# Patient Record
(demographics unavailable — no encounter records)

---

## 2024-10-29 NOTE — PHYSICAL EXAM
[Clean] : clean [Dry] : dry [Intact] : intact [Erythema] : no erythema [Drainage] : no drainage [Circumcised] : circumcised [Inguinal hernia] : no inguinal hernia [Testicle descended on left] : testicle descended on left [Testicle descended on right] : testicle descended on right [NL] : grossly intact

## 2024-10-29 NOTE — REASON FOR VISIT
[____ Week(s)] : [unfilled] week(s)  [Laparoscopic inguinal hernia repair] : laparoscopic inguinal hernia repair [Pain] : ~He/She~ does not have pain [Fever] : ~He/She~ does not have fever [Normal bowel movements] : ~He/She~ has normal bowel movements [Vomiting] : ~He/She~ does not have vomiting [Tolerating Diet] : ~He/She~ is tolerating diet [Redness at incision] : ~He/She~ does not have redness at incision [Drainage at incision] : ~He/She~ does not have drainage at incision [Swelling at surgical site] : ~He/She~ does not have swelling at surgical site [de-identified] : 10/8/24 [de-identified] : Dr. Poe

## 2024-10-29 NOTE — CONSULT LETTER
[Dear  ___] : Dear  [unfilled], [Courtesy Letter:] : I had the pleasure of seeing your patient, [unfilled], in my office today. [Please see my note below.] : Please see my note below. [Sincerely,] : Sincerely, [FreeTextEntry2] : Dr. Reyna [FreeTextEntry3] : Dotty Mcclain PA-C Supervisor, [facility] Department of General Pediatric Surgery & Trauma [place], [place] [address]

## 2024-10-29 NOTE — ASSESSMENT
[FreeTextEntry1] : NELY is a 6-month-old boy who is s/p laparoscopic left inguinal hernia repair with Dr. Sanders on 10/8 and presents today for a routine post op visit.  Mom states that he recovered well from surgery.  He is eating well, voiding and stooling normally.  On exam his incision is well healed, and his testes are down bilaterally.  I counseled Mom of the small chance of recurrence and she knows to call or come back in with any concerns.     Follow up with the pediatrician as usual and with pediatric surgery should any new or concerning symptoms arise. All questions answered.

## 2024-12-03 NOTE — HISTORY OF PRESENT ILLNESS
[FreeTextEntry1] : NV dry skin  [de-identified] : NELY CARTAGENA is a 7 month old male presenting for:  1. Red rash on the neck and chest for the past several months. Not very itchy. Has not tried HC that pediatrician prescribed because it was resolving on its own. Mother with a history of eczema.   soap: Pipette (fragrance free)  moisturizer: Vanicream, Aquaphor, Pipette (nonfragranced)  detergent: Attitude

## 2024-12-03 NOTE — ASSESSMENT
[Use of independent historian: [ enter independent historian's relationship to patient ] :____] : As the patient was unable to provide a complete and reliable history, I obtained clinical history from the patient's [unfilled] [FreeTextEntry1] : Atopic derm and xerosis, mild, new diagnosis with uncertain prognosis  -Start Hydrocortisone 2.5% ointment to AA BID for 2 weeks on, 1 week off. SED. - Vaseline prior to meals  Dry skin care reviewed:   - Take short showers/baths (avoid hot water)  - Use a mild soap (eg. CeraVe cleanser or Aquaphor)  - Use soap only on areas truly needed (underarms,groin,buttocks,fold areas, feet, face, hair)  - Pat off excess water and put moisturizer on immediately (within 3 min.)  Good moisturizing choices include:  1. Cetaphil cream (not baby Cetaphil)  2. CeraVe cream  3. Vanicream cream  4. Aquaphor ointment  5. Vaseline ointment  6. CeraVe ointment  - A moisturizer should always be applied after showering or bathing, but may be applied as many additional times as is necessary.  - RTC as needed

## 2024-12-03 NOTE — CONSULT LETTER
[Dear  ___] : Dear  [unfilled], [Consult Letter:] : I had the pleasure of evaluating your patient, [unfilled]. [Please see my note below.] : Please see my note below. [Consult Closing:] : Thank you very much for allowing me to participate in the care of this patient.  If you have any questions, please do not hesitate to contact me. [Sincerely,] : Sincerely, [FreeTextEntry3] : Krystle Shipman MD Pediatric Dermatology [facility]